# Patient Record
Sex: MALE | Race: WHITE | Employment: OTHER | URBAN - METROPOLITAN AREA
[De-identification: names, ages, dates, MRNs, and addresses within clinical notes are randomized per-mention and may not be internally consistent; named-entity substitution may affect disease eponyms.]

---

## 2019-08-19 ENCOUNTER — HOSPITAL ENCOUNTER (OUTPATIENT)
Dept: CT IMAGING | Age: 64
Discharge: HOME | End: 2019-08-19
Attending: INTERNAL MEDICINE

## 2019-08-19 DIAGNOSIS — Z13.6 SCREENING, HEART DISEASE, ISCHEMIC: ICD-10-CM

## 2019-09-11 ENCOUNTER — HOSPITAL ENCOUNTER (OUTPATIENT)
Dept: CT IMAGING | Age: 64
Discharge: HOME | End: 2019-09-11
Attending: INTERNAL MEDICINE

## 2019-09-11 DIAGNOSIS — R91.1 NODULE OF LEFT LUNG: ICD-10-CM

## 2020-02-25 ENCOUNTER — ANESTHESIA EVENT (OUTPATIENT)
Dept: ENDOSCOPY | Age: 65
End: 2020-02-25
Payer: COMMERCIAL

## 2020-02-26 ENCOUNTER — ANESTHESIA (OUTPATIENT)
Dept: ENDOSCOPY | Age: 65
End: 2020-02-26
Payer: COMMERCIAL

## 2020-02-26 ENCOUNTER — HOSPITAL ENCOUNTER (OUTPATIENT)
Age: 65
Setting detail: OUTPATIENT SURGERY
Discharge: HOME | End: 2020-02-26
Attending: INTERNAL MEDICINE | Admitting: INTERNAL MEDICINE
Payer: COMMERCIAL

## 2020-02-26 VITALS
OXYGEN SATURATION: 98 % | BODY MASS INDEX: 25.73 KG/M2 | TEMPERATURE: 98.5 F | HEIGHT: 72 IN | WEIGHT: 190 LBS | HEART RATE: 46 BPM | RESPIRATION RATE: 16 BRPM | DIASTOLIC BLOOD PRESSURE: 57 MMHG | SYSTOLIC BLOOD PRESSURE: 107 MMHG

## 2020-02-26 PROCEDURE — 76040000025: Performed by: INTERNAL MEDICINE

## 2020-02-26 PROCEDURE — 88305 TISSUE EXAM BY PATHOLOGIST: CPT

## 2020-02-26 PROCEDURE — 74011000250 HC RX REV CODE- 250: Performed by: NURSE ANESTHETIST, CERTIFIED REGISTERED

## 2020-02-26 PROCEDURE — 88312 SPECIAL STAINS GROUP 1: CPT

## 2020-02-26 PROCEDURE — 76060000031 HC ANESTHESIA FIRST 0.5 HR: Performed by: INTERNAL MEDICINE

## 2020-02-26 PROCEDURE — 77030021678 HC GLIDESCP STAT DISP VERT -B: Performed by: ANESTHESIOLOGY

## 2020-02-26 PROCEDURE — 74011250636 HC RX REV CODE- 250/636: Performed by: ANESTHESIOLOGY

## 2020-02-26 PROCEDURE — 74011250636 HC RX REV CODE- 250/636: Performed by: NURSE ANESTHETIST, CERTIFIED REGISTERED

## 2020-02-26 PROCEDURE — 77030008703 HC TU ET UNCUF COVD -A: Performed by: ANESTHESIOLOGY

## 2020-02-26 PROCEDURE — 77030021593 HC FCPS BIOP ENDOSC BSC -A: Performed by: INTERNAL MEDICINE

## 2020-02-26 RX ORDER — SODIUM CHLORIDE, SODIUM LACTATE, POTASSIUM CHLORIDE, CALCIUM CHLORIDE 600; 310; 30; 20 MG/100ML; MG/100ML; MG/100ML; MG/100ML
100 INJECTION, SOLUTION INTRAVENOUS CONTINUOUS
Status: DISCONTINUED | OUTPATIENT
Start: 2020-02-26 | End: 2020-02-26 | Stop reason: HOSPADM

## 2020-02-26 RX ORDER — LIDOCAINE HYDROCHLORIDE 20 MG/ML
INJECTION, SOLUTION EPIDURAL; INFILTRATION; INTRACAUDAL; PERINEURAL AS NEEDED
Status: DISCONTINUED | OUTPATIENT
Start: 2020-02-26 | End: 2020-02-26 | Stop reason: HOSPADM

## 2020-02-26 RX ORDER — FENTANYL CITRATE 50 UG/ML
INJECTION, SOLUTION INTRAMUSCULAR; INTRAVENOUS AS NEEDED
Status: DISCONTINUED | OUTPATIENT
Start: 2020-02-26 | End: 2020-02-26 | Stop reason: HOSPADM

## 2020-02-26 RX ORDER — PROPOFOL 10 MG/ML
INJECTION, EMULSION INTRAVENOUS AS NEEDED
Status: DISCONTINUED | OUTPATIENT
Start: 2020-02-26 | End: 2020-02-26 | Stop reason: HOSPADM

## 2020-02-26 RX ORDER — SUCCINYLCHOLINE CHLORIDE 20 MG/ML
INJECTION INTRAMUSCULAR; INTRAVENOUS AS NEEDED
Status: DISCONTINUED | OUTPATIENT
Start: 2020-02-26 | End: 2020-02-26 | Stop reason: HOSPADM

## 2020-02-26 RX ORDER — ROCURONIUM BROMIDE 10 MG/ML
INJECTION, SOLUTION INTRAVENOUS AS NEEDED
Status: DISCONTINUED | OUTPATIENT
Start: 2020-02-26 | End: 2020-02-26 | Stop reason: HOSPADM

## 2020-02-26 RX ORDER — DEXAMETHASONE SODIUM PHOSPHATE 4 MG/ML
INJECTION, SOLUTION INTRA-ARTICULAR; INTRALESIONAL; INTRAMUSCULAR; INTRAVENOUS; SOFT TISSUE AS NEEDED
Status: DISCONTINUED | OUTPATIENT
Start: 2020-02-26 | End: 2020-02-26 | Stop reason: HOSPADM

## 2020-02-26 RX ORDER — ONDANSETRON 2 MG/ML
INJECTION INTRAMUSCULAR; INTRAVENOUS AS NEEDED
Status: DISCONTINUED | OUTPATIENT
Start: 2020-02-26 | End: 2020-02-26 | Stop reason: HOSPADM

## 2020-02-26 RX ADMIN — SUCCINYLCHOLINE CHLORIDE 160 MG: 20 INJECTION, SOLUTION INTRAMUSCULAR; INTRAVENOUS at 08:06

## 2020-02-26 RX ADMIN — PROPOFOL 200 MG: 10 INJECTION, EMULSION INTRAVENOUS at 08:06

## 2020-02-26 RX ADMIN — DEXAMETHASONE SODIUM PHOSPHATE 4 MG: 4 INJECTION, SOLUTION INTRAMUSCULAR; INTRAVENOUS at 08:13

## 2020-02-26 RX ADMIN — LIDOCAINE HYDROCHLORIDE 80 MG: 20 INJECTION, SOLUTION EPIDURAL; INFILTRATION; INTRACAUDAL; PERINEURAL at 08:06

## 2020-02-26 RX ADMIN — ROCURONIUM BROMIDE 5 MG: 10 INJECTION, SOLUTION INTRAVENOUS at 08:06

## 2020-02-26 RX ADMIN — ONDANSETRON 4 MG: 2 INJECTION INTRAMUSCULAR; INTRAVENOUS at 08:13

## 2020-02-26 RX ADMIN — FENTANYL CITRATE 50 MCG: 50 INJECTION INTRAMUSCULAR; INTRAVENOUS at 08:03

## 2020-02-26 RX ADMIN — SODIUM CHLORIDE, SODIUM LACTATE, POTASSIUM CHLORIDE, AND CALCIUM CHLORIDE: 600; 310; 30; 20 INJECTION, SOLUTION INTRAVENOUS at 07:59

## 2020-02-26 NOTE — DISCHARGE INSTRUCTIONS
Upper GI Endoscopy: What to Expect at [address] [place] [address]  After you have an endoscopy you will be able to go home after your doctor or nurse checks to make sure you are not having any problems. You may have to stay overnight if you had treatment during the test. You may have a sore throat for a day or two after the test.  This care sheet gives you a general idea about what to expect after the test.  How can you care for yourself at home? Activity  · Rest as much as you need to after you go home. · You should be able to go back to your usual activities the day after the test.  Diet  · Follow your doctor's directions for eating after the test.  · Drink plenty of fluids (unless your doctor has told you not to). Medications  · If you have a sore throat the day after the test, use an over-the-counter spray to numb your throat. Follow-up care is a key part of your treatment and safety. Be sure to make and go to all appointments, and call your doctor if you are having problems. It's also a good idea to know your test results and keep a list of the medicines you take. When should you call for help? Call 911 anytime you think you may need emergency care. For example, call if:  · You passed out (lost consciousness). · You cough up blood. · You vomit blood or what looks like coffee grounds. · You pass maroon or very bloody stools. Call your doctor now or seek immediate medical care if:  · You have trouble swallowing. · You have belly pain. · Your stools are black and tarlike or have streaks of blood. · You are sick to your stomach or cannot keep fluids down. Watch closely for changes in your health, and be sure to contact your doctor if:  · Your throat still hurts after a day or two. You do not get better as expected.     After general anesthesia or intravenous sedation, for 24 hours or while taking prescription Narcotics:  · Limit your activities  · A responsible adult needs to be with you for the next 24 hours  · Do not drive and operate hazardous machinery  · Do not make important personal or business decisions  · Do  not drink alcoholic beverages  · If you have not urinated within 8 hours after discharge, please contact your surgeon on call. · If you have sleep apnea and have a CPAP machine, please use it for all naps and sleeping. · Please use caution when taking narcotics and any of your home medications that may cause drowsiness. *  Please give a list of your current medications to your Primary Care Provider. *  Please update this list whenever your medications are discontinued, doses are      changed, or new medications (including over-the-counter products) are added. *  Please carry medication information at all times in case of emergency situations. These are general instructions for a healthy lifestyle:  No smoking/ No tobacco products/ Avoid exposure to second hand smoke  Surgeon General's Warning:  Quitting smoking now greatly reduces serious risk to your health. Obesity, smoking, and sedentary lifestyle greatly increases your risk for illness  A healthy diet, regular physical exercise & weight monitoring are important for maintaining a healthy lifestyle    You may be retaining fluid if you have a history of heart failure or if you experience any of the following symptoms:  Weight gain of 3 pounds or more overnight or 5 pounds in a week, increased swelling in our hands or feet or shortness of breath while lying flat in bed. Please call your doctor as soon as you notice any of these symptoms; do not wait until your next office visit. Gastrointestinal Esophagogastroduodenoscopy (EGD) - Upper Exam Discharge Instructions    1. Call Dr. Tami Swanson at  [phone] for any problems or questions. 2. Contact the doctor's office for follow up appointment as directed. 5. Ordinarily, you may resume regular diet and activity after exam unless otherwise specified by your physician.     6. For mild soreness in your throat you may use Cepacol throat lozenges or warm salt-water gargles as needed. Any additional instructions: Instructions given to Ginaemily Daniel and other family members.   Instructions given by:  Jeff Dela Cruz RN

## 2020-02-26 NOTE — H&P
Gastrointestinal Progress Note    2/26/2020    Admit Date: 2/26/2020    Subjective:     Patient is NPO for an EGD (dysphagia with ? Foreign body). Pain: Patient complains of no abdominal pain. Bowel Movements: Normal    Bleeding:  None    Current Facility-Administered Medications   Medication Dose Route Frequency    lactated Ringers infusion  100 mL/hr IntraVENous CONTINUOUS        Objective:     Blood pressure 134/60, pulse (!) 53, temperature 97.8 °F (36.6 °C), resp. rate 18, height 6' (1.829 m), weight 86.2 kg (190 lb), SpO2 99 %. No intake/output data recorded. No intake/output data recorded. EXAM:  HEART: regular rate and rhythm, S1, S2 normal, no murmur, click, rub or gallop, LUNGS: chest clear, no wheezing, rales, normal symmetric air entry, Heart exam - S1, S2 normal, no murmur, no gallop, rate regular and ABDOMEN:  Bowel sounds are normal, liver is not enlarged, spleen is not enlarged    Data Review  No results found for this or any previous visit (from the past 24 hour(s)). Assessment:     Dysphagia  ? Foreign body of the esophagus  History of achalasia  HTN  Hyperlipidemia    Plan:     EGD with possible foreign body removal and dilatation. Risks (bleed, perforation, infection, untoward CV or resp.event, mortality, etc.), benefits, and alternatives were discussed with the patient who agrees to proceed.   Marceline Cranker, MD

## 2020-02-26 NOTE — ANESTHESIA POSTPROCEDURE EVALUATION
Procedure(s):  ESOPHAGOGASTRODUODENOSCOPY  ESOPHAGOGASTRODUODENAL (EGD) BIOPSY. total IV anesthesia    Anesthesia Post Evaluation      Multimodal analgesia: multimodal analgesia not used between 6 hours prior to anesthesia start to PACU discharge  Patient location during evaluation: bedside  Patient participation: complete - patient participated  Level of consciousness: awake  Pain score: 1  Pain management: adequate  Airway patency: patent  Anesthetic complications: no  Cardiovascular status: acceptable  Respiratory status: acceptable  Hydration status: acceptable  Comments: Pt doing well. Post anesthesia nausea and vomiting:  none      Vitals Value Taken Time   /57 2/26/2020  8:37 AM   Temp 36.7 °C (98 °F) 2/26/2020  8:27 AM   Pulse 54 2/26/2020  8:39 AM   Resp 14 2/26/2020  8:27 AM   SpO2 96 % 2/26/2020  8:39 AM   Vitals shown include unvalidated device data.

## 2020-02-26 NOTE — OP NOTES
[facility]  OPERATIVE REPORT    Name:  Aniyah Klein  MR#:  [id number]  :  1955  ACCOUNT #:  [de-identified]  DATE OF SERVICE:  2020    PREOPERATIVE DIAGNOSES:  1. Dysphagia. 2.  Possible foreign body obstruction, esophagus. POSTOPERATIVE DIAGNOSIS:  Grade IV distal esophagitis. PROCEDURE PERFORMED:  EGD with biopsy. SURGEON:  Jennifer Thomas MD    ASSISTANT:  None. ANESTHESIA:  General anesthesia (endotracheal tube placed). COMPLICATIONS:  None. SPECIMENS REMOVED:  None    IMPLANTS:  None. ESTIMATED BLOOD LOSS:  0 to 1 mL    SUBJECTIVE:  A 62-year-old male who is undergoing an upper endoscopy because of a possible foreign body obstructing the esophagus (meat bolus). He believed this has passed. He apparently has carried the diagnosis of achalasia diagnosed in [place] years ago. Upper endoscopy is done today to document the presence or absence of a foreign body and to proceed with dilatation if possible. Risks (bleeding, perforation, infection, untoward cardiovascular or respiratory event, and mortality), benefits, and alternatives were discussed in detail with the patient who agrees to proceed. PROCEDURE:  With the patient placed in left side down position, and after endotracheal intubation, the video endoscope was passed through the hypopharynx into the esophagus with minimal difficulty. There was some blood in the hypopharynx from the intubation. The proximal, mid, and distal esophagus were remarkable for a somewhat slightly tortuous esophagus. There were some secretions present. There was no foreign body present. There were distal esophageal ulcers consistent with LA grade D esophagitis. No foreign body was identified. Once past the EG junction, the body and antrum were unremarkable except for gastric polyps. Retroflexed view of the angularis was normal.  A retroflexed view of the EG junction and cardia revealed no mass seen. Attention was turned to the duodenum. Duodenal bulb and C-loop looked normal.  No active ulcerative disease was seen. The endoscope was backed to the stomach. Air was decompressed. Endoscope was backed to the esophagus where the esophageal ulcers were biopsied. (Previously, the patient had previous esophageal biopsies that were negative for eosinophilic esophagitis.)  After documentation of hemostasis, the endoscope was backed out of the esophagus and out of the patient. The vocal cords could not be seen secondary to the fact the endotracheal tube was in place. There was minimal heme in the hypopharynx but related to previous endotracheal tube intubation. The patient remained in the endo fluoro suite in stable condition. IMPRESSION:  1. Distal ulcers (Duvall grade D esophagitis) but no foreign body present. 2.  No dilatation performed secondary to inflammation of the distal esophagus. 3.  Gastric polyps, fundic-gland appearing. PLAN:  Diagnostic:  Followup EGD and dilatation in approximately eight weeks to allow the ulcers to heal.    Therapeutic:  1. Increase PPI therapy to b.i.d.  2.  Further recommendations pending above.       MD ALYSE Mcgee/[email or web address]/  D:  02/26/2020 8:41  T:  02/26/2020 17:36  JOB #:  [id number]  CC:  [facility]       MD Allyn Maher MD

## 2020-02-26 NOTE — ANESTHESIA PREPROCEDURE EVALUATION
Relevant Problems   No relevant active problems       Anesthetic History     Increased risk of difficult airway (DL x3; Requires a Glidescope)          Review of Systems / Medical History  Patient summary reviewed and pertinent labs reviewed    Pulmonary  Within defined limits                 Neuro/Psych   Within defined limits           Cardiovascular    Hypertension: well controlled          Hyperlipidemia    Exercise tolerance: >4 METS  Comments: Denies CP, SOB or changes in functional status   GI/Hepatic/Renal     GERD: well controlled           Endo/Other             Other Findings              Physical Exam    Airway  Mallampati: IV  TM Distance: 4 - 6 cm  Neck ROM: normal range of motion   Mouth opening: Normal     Cardiovascular    Rhythm: regular  Rate: normal         Dental    Dentition: Poor dentition     Pulmonary  Breath sounds clear to auscultation               Abdominal  GI exam deferred       Other Findings            Anesthetic Plan    ASA: 2  Anesthesia type: general          Induction: Intravenous and RSI  Anesthetic plan and risks discussed with: Patient      Pt states that he had food stuck in his esophagus yesterday afternoon. He feels this eventually passed but he ate again last evening. Due to elevated risk of aspiration with achalasia, will proceed with RSI, GETA. Pt with hx/o difficult airway requiring Glidescope.

## 2020-12-20 PROBLEM — Z12.11 COLON CANCER SCREENING: Status: ACTIVE | Noted: 2020-12-20

## 2020-12-20 PROBLEM — Z23 NEED FOR SHINGLES VACCINE: Status: ACTIVE | Noted: 2020-12-20

## 2020-12-20 PROBLEM — Z11.59 NEED FOR HEPATITIS C SCREENING TEST: Status: ACTIVE | Noted: 2020-12-20

## 2020-12-21 PROBLEM — Z12.5 PROSTATE CANCER SCREENING: Status: ACTIVE | Noted: 2020-12-21

## 2021-05-24 PROBLEM — Z11.59 NEED FOR HEPATITIS C SCREENING TEST: Status: RESOLVED | Noted: 2020-12-20 | Resolved: 2021-05-24

## 2022-03-19 PROBLEM — Z23 ENCOUNTER FOR IMMUNIZATION: Status: ACTIVE | Noted: 2020-12-20

## 2022-03-19 PROBLEM — Z12.11 COLON CANCER SCREENING: Status: ACTIVE | Noted: 2020-12-20

## 2022-03-20 PROBLEM — Z12.5 PROSTATE CANCER SCREENING: Status: ACTIVE | Noted: 2020-12-21

## 2022-05-25 RX ORDER — PRAVASTATIN SODIUM 40 MG
TABLET ORAL
Qty: 90 TABLET | Refills: 3 | OUTPATIENT
Start: 2022-05-25

## 2022-08-22 ENCOUNTER — OFFICE VISIT (OUTPATIENT)
Dept: INTERNAL MEDICINE CLINIC | Facility: CLINIC | Age: 67
End: 2022-08-22
Payer: COMMERCIAL

## 2022-08-22 VITALS
TEMPERATURE: 98.6 F | OXYGEN SATURATION: 98 % | BODY MASS INDEX: 26.51 KG/M2 | HEIGHT: 73 IN | SYSTOLIC BLOOD PRESSURE: 135 MMHG | DIASTOLIC BLOOD PRESSURE: 80 MMHG | WEIGHT: 200 LBS | HEART RATE: 65 BPM

## 2022-08-22 DIAGNOSIS — Z12.11 COLON CANCER SCREENING: ICD-10-CM

## 2022-08-22 DIAGNOSIS — E78.00 HYPERCHOLESTEROLEMIA: ICD-10-CM

## 2022-08-22 DIAGNOSIS — Z23 ENCOUNTER FOR IMMUNIZATION: ICD-10-CM

## 2022-08-22 DIAGNOSIS — L40.9 PSORIASIS: ICD-10-CM

## 2022-08-22 DIAGNOSIS — Z82.49 FAMILY HISTORY OF HEART DISEASE IN MALE FAMILY MEMBER BEFORE AGE 55: ICD-10-CM

## 2022-08-22 DIAGNOSIS — Z12.5 PROSTATE CANCER SCREENING: ICD-10-CM

## 2022-08-22 DIAGNOSIS — I10 ESSENTIAL HYPERTENSION: ICD-10-CM

## 2022-08-22 DIAGNOSIS — I10 ESSENTIAL HYPERTENSION: Primary | ICD-10-CM

## 2022-08-22 DIAGNOSIS — K21.00 GASTROESOPHAGEAL REFLUX DISEASE WITH ESOPHAGITIS WITHOUT HEMORRHAGE: ICD-10-CM

## 2022-08-22 DIAGNOSIS — F51.01 PRIMARY INSOMNIA: ICD-10-CM

## 2022-08-22 PROCEDURE — 99214 OFFICE O/P EST MOD 30 MIN: CPT | Performed by: INTERNAL MEDICINE

## 2022-08-22 RX ORDER — MELOXICAM 15 MG/1
1 TABLET ORAL DAILY
COMMUNITY
Start: 2022-07-27

## 2022-08-22 ASSESSMENT — ENCOUNTER SYMPTOMS
RECTAL PAIN: 0
STRIDOR: 0
CHOKING: 0
VOICE CHANGE: 0
EYE PAIN: 0

## 2022-08-22 NOTE — PROGRESS NOTES
FOLLOWUP VISIT    Subjective:    Mr. Sang Beard is a 61 y.o., male,   Chief Complaint   Patient presents with    Follow-up Chronic Condition       HPI:    Patient presents today for follow up of two or more chronic medical problems and review of labs. The patient has hypertension. The patient has been on an attempted low sodium diet and has been trying to exercise and maintain a healthy weight. The patient reports good compliance with the blood pressure medications and good blood pressure readings on home / ambulatory monitoring. The patient has GERD. The patient has been on attempted therapeutic lifestyle change including smaller more frequent meals and avoiding caffeine. The patient states that the GERD symptoms have been well controlled on current treatment and denies any dysphagia. The patient has hyperlipidemia. The patient has been following a low cholesterol diet and denies any myalgias or weakness on current lipid lowering therapy. Also questions whether he should have a \"routine\" stress test.  Denies any cardiac symptoms. Very physically active. The following portions of the patient's history were reviewed and updated as appropriate:      Past Medical History:   Diagnosis Date    Dyskinesia of esophagus     Elevated coronary artery calcium score     Esophageal achalasia     GERD (gastroesophageal reflux disease)     Hyperlipidemia 03/02/2015    Hypertension     Insomnia 03/02/2015    Lumbago 3/2/2015    Need for hepatitis C screening test 12/20/2020 1/25/21 HCV ab negative.      Psoriasis        Past Surgical History:   Procedure Laterality Date    COLONOSCOPY  2010    Normal    KNEE ARTHROSCOPY Left 2013    OTHER SURGICAL HISTORY  2010    EGD Dyskinesia surgery       Family History   Problem Relation Age of Onset    Heart Disease Father     Diabetes Father     Hypertension Maternal Grandmother     Diabetes Maternal Grandfather        Social History     Socioeconomic History Marital status:      Spouse name: Not on file    Number of children: Not on file    Years of education: Not on file    Highest education level: Not on file   Occupational History    Not on file   Tobacco Use    Smoking status: Never    Smokeless tobacco: Never   Substance and Sexual Activity    Alcohol use: Yes     Alcohol/week: 6.0 standard drinks    Drug use: No    Sexual activity: Not on file   Other Topics Concern    Not on file   Social History Narrative    Not on file     Social Determinants of Health     Financial Resource Strain: Not on file   Food Insecurity: Not on file   Transportation Needs: Not on file   Physical Activity: Not on file   Stress: Not on file   Social Connections: Not on file   Intimate Partner Violence: Not on file   Housing Stability: Not on file       Current Outpatient Medications   Medication Sig Dispense Refill    meloxicam (MOBIC) 15 MG tablet Take 1 tablet by mouth daily      cyclobenzaprine (FLEXERIL) 10 MG tablet TAKE 1 TABLET 3 TIMES A DAY AS NEEDED FOR MUSCLE SPASM      lisinopril (PRINIVIL;ZESTRIL) 20 MG tablet TAKE 1 TABLET BY MOUTH DAILY      pantoprazole (PROTONIX) 40 MG tablet TAKE 1 TABLET BY MOUTH TWICE DAILY      pravastatin (PRAVACHOL) 40 MG tablet TAKE 1 TABLET BY MOUTH AT NIGHT      zolpidem (AMBIEN) 10 MG tablet TAKE 1 TABLET BY MOUTH NIGHTLY AS NEEDED FOR SLEEP. MAXIMUM DAILY DOSE: 10MG. No current facility-administered medications for this visit. Allergies as of 08/22/2022 - never reviewed   Allergen Reaction Noted    Atorvastatin Hives 08/15/2019       Review of Systems   Constitutional:  Negative for activity change and appetite change. HENT:  Negative for drooling and voice change. Eyes:  Negative for pain. Respiratory:  Negative for choking and stridor. Gastrointestinal:  Negative for rectal pain. Endocrine: Negative for polydipsia and polyphagia. Genitourinary:  Negative for enuresis and penile pain.    Musculoskeletal: Negative for gait problem and neck stiffness. Skin:  Negative for pallor. Allergic/Immunologic: Negative for immunocompromised state. Neurological:  Negative for facial asymmetry and speech difficulty. Hematological:  Does not bruise/bleed easily. Psychiatric/Behavioral:  Negative for self-injury. The patient is not hyperactive. Patient Care Team:  Zachary Yao MD as PCP - General  Zachary Yao MD as PCP - [facility] Empaneled Provider    Objective:    /80 (Site: Left Upper Arm, Position: Sitting)   Pulse 65   Temp 98.6 °F (37 °C) (Temporal)   Ht 6' 1\" (1.854 m)   Wt 200 lb (90.7 kg)   SpO2 98%   BMI 26.39 kg/m²     Physical Exam  Vitals reviewed. Constitutional:       General: He is not in acute distress. Appearance: Normal appearance. He is not toxic-appearing. HENT:      Head: Normocephalic and atraumatic. Right Ear: Tympanic membrane, ear canal and external ear normal.      Left Ear: Tympanic membrane, ear canal and external ear normal.      Nose: Nose normal.      Mouth/Throat:      Mouth: Mucous membranes are moist.      Pharynx: Oropharynx is clear. Eyes:      General: No scleral icterus. Extraocular Movements: Extraocular movements intact. Conjunctiva/sclera: Conjunctivae normal.      Pupils: Pupils are equal, round, and reactive to light. Cardiovascular:      Rate and Rhythm: Normal rate and regular rhythm. Pulses: Normal pulses. Heart sounds: Normal heart sounds. Pulmonary:      Breath sounds: Normal breath sounds. Abdominal:      General: Abdomen is flat. Bowel sounds are normal.      Palpations: Abdomen is soft. There is no mass. Tenderness: There is no guarding or rebound. Musculoskeletal:         General: Normal range of motion. Cervical back: Normal range of motion and neck supple. Skin:     General: Skin is warm and dry. Coloration: Skin is not jaundiced.    Neurological:      Mental Status: He is alert and oriented to person, place, and time. Mental status is at baseline. Psychiatric:         Behavior: Behavior normal.         Thought Content: Thought content normal.            Orders Only on 01/25/2021   Component Date Value Ref Range Status    Glucose 01/25/2021 84  65 - 99 mg/dL Final    BUN 01/25/2021 15  8 - 27 mg/dL Final    Creatinine 01/25/2021 0.99  0.76 - 1.27 mg/dL Final    EGFR IF NonAfrican American 01/25/2021 81  >59 mL/min/1.73 Final    GFR  01/25/2021 93  >59 mL/min/1.73 Final    Bun/Cre Ratio 01/25/2021 15  10 - 24 NA Final    Sodium 01/25/2021 141  134 - 144 mmol/L Final    Potassium 01/25/2021 4.7  3.5 - 5.2 mmol/L Final    Chloride 01/25/2021 101  96 - 106 mmol/L Final    CO2 01/25/2021 25  20 - 29 mmol/L Final    Calcium 01/25/2021 8.2 (A) 8.6 - 10.2 mg/dL Final    Total Protein 01/25/2021 6.7  6.0 - 8.5 g/dL Final    Albumin 01/25/2021 4.5  3.8 - 4.8 g/dL Final    Globulin, Total 01/25/2021 2.2  1.5 - 4.5 g/dL Final    Albumin/Globulin Ratio 01/25/2021 2.0  1.2 - 2.2 NA Final    Total Bilirubin 01/25/2021 1.0  0.0 - 1.2 mg/dL Final    Alkaline Phosphatase 01/25/2021 72  39 - 117 IU/L Final    AST 01/25/2021 28  0 - 40 IU/L Final    ALT 01/25/2021 21  0 - 44 IU/L Final    Cholesterol, Total 01/25/2021 182  100 - 199 mg/dL Final    Triglycerides 01/25/2021 71  0 - 149 mg/dL Final    HDL 01/25/2021 70  >39 mg/dL Final    VLDL 01/25/2021 13  5 - 40 mg/dL Final    LDL Calculated 01/25/2021 99  0 - 99 mg/dL Final    HCV Ab 01/25/2021 <0.1  0.0 - 0.9 s/co ratio Final    Comment:                                   Negative:     < 0.8                               Indeterminate: 0.8 - 0.9                                    Positive:     > 0.9   The CDC recommends that a positive HCV antibody result   be followed up with a HCV Nucleic Acid Amplification   test ([id number]). Assessent & Plan:        1. Essential hypertension  Overview:  Well controlled on lisinopril 20 mg daily.   The patient will continue the current treatment. Orders:  -     Comprehensive Metabolic Panel; Future  2. Primary insomnia  Overview:  Symptoms relieved with Ambien 10 mg HS PRN. The patient was educated regarding insomnia and the use of   nonpharmacologic strategies to improve sleep. The patient will continue the current treatment. The patient was advised to not drive or engage in other potentially hazardous activities for at least 8 hours after taking any medication for sleep. 3. Hypercholesterolemia  Overview:  1/25/21 total 182, HDL 70, LDL 99, trig 71 on pravastatin 40 mg daily. 12/19/19 total 158, HDL 64, LDL 78, trig 82 on zetia 10 mg daily. 8/19/19 coronary artery calcium score = 830 (80th percentile). CRF = male age > [address], family history (father had CABG age [address]), hypertension. Patient allergic to atorvastatin (hives). Reviewed the most recent lipid panel with the patient, and interpreted the results within the context of the patient's personal cardiovascular risk factors. Discussed/reinforced a low-cholesterol diet. The patient will continue current intensity statin therapy. Orders:  -     Lipid Panel; Future  4. Gastroesophageal reflux disease with esophagitis without hemorrhage  Overview:  5/7/20 EGD (Dr. Jahaira Carbone) - erosive esophagitis, benign esophageal stricture dilated. The patient will continue the current treatment / chronic PPI therapy with Protonix. 5. Psoriasis  Overview: Followed by dermatology. Controlled with topical therapy PRN. The patient will continue the current treatment. 6. Encounter for immunization  Overview:  Vaccinations were reviewed and discussed. 7. Colon cancer screening  Overview:  5/7/20 colonoscopy (Dr. Jahaira Carbone) reportedly normal.  Repeat 10 years. 8. Prostate cancer screening  Overview:  7/14/20 PSA 2.7. Check PSA. Orders:  -     PSA Screening; Future  9.  Family history of heart disease in male family member before age 55  Overview:  Patient reports a prior elevated coronary calcium score. He has a family history of premature heart disease (father). Patient questioned whether he should have a \"routine\" stress test. He feels entirely well. He states that he walks on average 14,000 steps a day (golfing, walking) and he exercises at the gym several days a week. He has no symptoms. Recommended continued statin and antihypertensive therapy. Offered him referral to cardiology for evaluation which he declines at this time. The patient and/or patient representative voiced understanding and agreement with the current diagnoses, recommendations, and possible side effects. Return in about 6 months (around 2/22/2023) for annual wellness, review labs. Labs today after visit. Leyla Dillon

## 2022-08-23 ENCOUNTER — TELEPHONE (OUTPATIENT)
Dept: INTERNAL MEDICINE CLINIC | Facility: CLINIC | Age: 67
End: 2022-08-23

## 2022-08-23 LAB
ALBUMIN SERPL-MCNC: 4 G/DL (ref 3.2–4.6)
ALBUMIN/GLOB SERPL: 1.3 {RATIO} (ref 1.2–3.5)
ALP SERPL-CCNC: 79 U/L (ref 50–136)
ALT SERPL-CCNC: 44 U/L (ref 12–65)
ANION GAP SERPL CALC-SCNC: 1 MMOL/L (ref 7–16)
AST SERPL-CCNC: 29 U/L (ref 15–37)
BILIRUB SERPL-MCNC: 0.6 MG/DL (ref 0.2–1.1)
BUN SERPL-MCNC: 26 MG/DL (ref 8–23)
CALCIUM SERPL-MCNC: 9.2 MG/DL (ref 8.3–10.4)
CHLORIDE SERPL-SCNC: 107 MMOL/L (ref 98–107)
CHOLEST SERPL-MCNC: 219 MG/DL
CO2 SERPL-SCNC: 27 MMOL/L (ref 21–32)
CREAT SERPL-MCNC: 1 MG/DL (ref 0.8–1.5)
GLOBULIN SER CALC-MCNC: 3 G/DL (ref 2.3–3.5)
GLUCOSE SERPL-MCNC: 74 MG/DL (ref 65–100)
HDLC SERPL-MCNC: 54 MG/DL (ref 40–60)
HDLC SERPL: 4.1 {RATIO}
LDLC SERPL CALC-MCNC: 144.4 MG/DL
POTASSIUM SERPL-SCNC: 5 MMOL/L (ref 3.5–5.1)
PROT SERPL-MCNC: 7 G/DL (ref 6.3–8.2)
PSA SERPL-MCNC: 3.7 NG/ML
SODIUM SERPL-SCNC: 135 MMOL/L (ref 136–145)
TRIGL SERPL-MCNC: 103 MG/DL (ref 35–150)
VLDLC SERPL CALC-MCNC: 20.6 MG/DL (ref 6–23)

## 2022-08-23 NOTE — TELEPHONE ENCOUNTER
----- Message from Panchito Wallace MD sent at 8/22/2022 12:04 PM EDT -----  Regarding: Colonoscopy report  Patient had colonoscopy on 5/7/20 by Dr. Jahaira Carbone at [facility]. Please call for report.

## 2022-09-21 PROBLEM — Z12.5 PROSTATE CANCER SCREENING: Status: RESOLVED | Noted: 2020-12-21 | Resolved: 2022-09-21

## 2022-09-21 PROBLEM — Z12.11 COLON CANCER SCREENING: Status: RESOLVED | Noted: 2020-12-20 | Resolved: 2022-09-21

## 2022-10-19 DIAGNOSIS — F51.01 PRIMARY INSOMNIA: Primary | ICD-10-CM

## 2022-10-20 RX ORDER — PANTOPRAZOLE SODIUM 40 MG/1
TABLET, DELAYED RELEASE ORAL
Qty: 180 TABLET | Refills: 3 | Status: SHIPPED | OUTPATIENT
Start: 2022-10-20

## 2022-10-20 RX ORDER — LISINOPRIL 20 MG/1
TABLET ORAL
Qty: 90 TABLET | Refills: 3 | Status: SHIPPED | OUTPATIENT
Start: 2022-10-20

## 2022-10-20 RX ORDER — ZOLPIDEM TARTRATE 10 MG/1
TABLET ORAL
Qty: 30 TABLET | Refills: 2 | Status: SHIPPED | OUTPATIENT
Start: 2022-10-20 | End: 2022-11-19

## 2022-10-20 RX ORDER — PRAVASTATIN SODIUM 40 MG
TABLET ORAL
Qty: 90 TABLET | Refills: 3 | Status: SHIPPED | OUTPATIENT
Start: 2022-10-20

## 2023-01-06 ENCOUNTER — TELEPHONE (OUTPATIENT)
Dept: INTERNAL MEDICINE CLINIC | Facility: CLINIC | Age: 68
End: 2023-01-06

## 2023-01-06 NOTE — TELEPHONE ENCOUNTER
Patient's spouse called requesting Cardiac Calcium score number and when this was done. Cardiac calcium score was 8/19/19 and was 830. Spouse notified.

## (undated) DEVICE — FORCEPS BX L240CM JAW DIA2.8MM L CAP W/ NDL MIC MESH TOOTH

## (undated) DEVICE — BLOCK BITE AD 60FR W/ VELC STRP ADDRESSES MOST PT AND

## (undated) DEVICE — CONTAINER PREFIL FRMLN 40ML --

## (undated) DEVICE — CONNECTOR TBNG OD5-7MM O2 END DISP

## (undated) DEVICE — CANNULA NSL ORAL AD FOR CAPNOFLEX CO2 O2 AIRLFE

## (undated) DEVICE — KENDALL RADIOLUCENT FOAM MONITORING ELECTRODE RECTANGULAR SHAPE: Brand: KENDALL